# Patient Record
Sex: MALE | Race: WHITE | NOT HISPANIC OR LATINO | Employment: STUDENT | URBAN - METROPOLITAN AREA
[De-identification: names, ages, dates, MRNs, and addresses within clinical notes are randomized per-mention and may not be internally consistent; named-entity substitution may affect disease eponyms.]

---
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
## 2024-02-09 ENCOUNTER — OFFICE VISIT (OUTPATIENT)
Dept: FAMILY MEDICINE CLINIC | Facility: CLINIC | Age: 24
End: 2024-02-09
Payer: COMMERCIAL

## 2024-02-09 VITALS
HEART RATE: 95 BPM | BODY MASS INDEX: 21.53 KG/M2 | TEMPERATURE: 97.2 F | SYSTOLIC BLOOD PRESSURE: 96 MMHG | WEIGHT: 150.4 LBS | DIASTOLIC BLOOD PRESSURE: 52 MMHG | OXYGEN SATURATION: 98 % | RESPIRATION RATE: 20 BRPM | HEIGHT: 70 IN

## 2024-02-09 DIAGNOSIS — G47.9 RESTLESS SLEEPER: ICD-10-CM

## 2024-02-09 DIAGNOSIS — R40.0 HAS DAYTIME DROWSINESS: ICD-10-CM

## 2024-02-09 DIAGNOSIS — R06.83 LOUD SNORING: Primary | ICD-10-CM

## 2024-02-09 PROCEDURE — 99203 OFFICE O/P NEW LOW 30 MIN: CPT

## 2024-02-09 NOTE — PROGRESS NOTES
"Chief Complaint  Sleep Apnea    Subjective          Sleep Apnea  Associated symptoms include fatigue. Pertinent negatives include no abdominal pain, chest pain, chills, congestion, coughing, fever, myalgias, nausea, neck pain, rash, vomiting or weakness.       Margarito Alvarez 23 y.o. male presents today for a new patient appointment. He is here to establish care and is a new patient to me. I reviewed the PFSH recorded today by my MA/LPN staff.       The patient reports restlessness and twitching in sleep, waking up feeling restless, daytime drowsiness, loud snoring, and waking up with a dry mouth.  Symptom onset was around 2 months ago.  Evaluation to date: none.        Review of Systems   Constitutional:  Positive for fatigue. Negative for appetite change, chills and fever.   HENT:  Negative for congestion, sinus pressure and trouble swallowing.         Loud snoring, dry mouth upon awakening   Eyes:  Negative for visual disturbance.   Respiratory:  Negative for cough, chest tightness, shortness of breath and wheezing.    Cardiovascular:  Negative for chest pain, palpitations and leg swelling.   Gastrointestinal:  Negative for abdominal pain, constipation, diarrhea, nausea and vomiting.   Genitourinary:  Negative for dysuria, frequency and urgency.   Musculoskeletal:  Negative for gait problem, myalgias and neck pain.   Skin:  Negative for rash.   Neurological:  Negative for dizziness, syncope, weakness and light-headedness.   Psychiatric/Behavioral:  Positive for sleep disturbance (Restless sleep). Negative for behavioral problems and dysphoric mood. The patient is not nervous/anxious.         Objective   Vital Signs:   BP 96/52   Pulse 95   Temp 97.2 °F (36.2 °C) (Temporal)   Resp 20   Ht 177.8 cm (70\")   Wt 68.2 kg (150 lb 6.4 oz)   SpO2 98%   BMI 21.58 kg/m²      BMI is within normal parameters. No other follow-up for BMI required.       Physical Exam  Vitals and nursing note reviewed.   Constitutional:      "  General: He is not in acute distress.     Appearance: He is well-developed and normal weight.   HENT:      Head: Normocephalic and atraumatic.   Eyes:      General: No scleral icterus.        Right eye: No discharge.         Left eye: No discharge.      Conjunctiva/sclera: Conjunctivae normal.      Pupils: Pupils are equal, round, and reactive to light.   Neck:      Thyroid: No thyromegaly.      Vascular: No carotid bruit.      Trachea: No tracheal deviation.   Cardiovascular:      Rate and Rhythm: Normal rate and regular rhythm.      Pulses: Normal pulses.      Heart sounds: Normal heart sounds. No murmur heard.     No gallop.   Pulmonary:      Effort: Pulmonary effort is normal. No respiratory distress.      Breath sounds: Normal breath sounds. No wheezing or rales.   Musculoskeletal:         General: Normal range of motion.      Cervical back: Normal range of motion and neck supple.   Skin:     General: Skin is warm.      Coloration: Skin is not pale.      Findings: No erythema or rash.   Neurological:      Mental Status: He is alert and oriented to person, place, and time.      Motor: No weakness or abnormal muscle tone.      Coordination: Coordination normal.   Psychiatric:         Behavior: Behavior normal.         Thought Content: Thought content normal.         Judgment: Judgment normal.                         Assessment and Plan      Diagnoses and all orders for this visit:    1. Loud snoring (Primary)  Comments:  New patient  Referral to sleep medicine for further evaluation  Labs ordered  Return if symptoms worsen  Orders:  -     Ambulatory Referral to Sleep Medicine  -     Comprehensive metabolic panel  -     Lipid panel  -     CBC and Differential  -     TSH    2. Has daytime drowsiness  Comments:  New patient  Referral to sleep medicine for further evaluation  Labs ordered  Return if symptoms worsen  Orders:  -     Ambulatory Referral to Sleep Medicine  -     Comprehensive metabolic panel  -      Lipid panel  -     CBC and Differential  -     TSH    3. Restless sleeper  Comments:  New patient  Referral to sleep medicine for further evaluation  Labs ordered  Return if symptoms worsen  Orders:  -     Ambulatory Referral to Sleep Medicine  -     Comprehensive metabolic panel  -     Lipid panel  -     CBC and Differential  -     TSH            Follow Up     Return if symptoms worsen or fail to improve.    Patient was given instructions and counseling regarding his condition or for health maintenance advice. Please see specific information pulled into the AVS if appropriate.

## 2024-02-17 LAB
ALBUMIN SERPL-MCNC: 5 G/DL (ref 4.3–5.2)
ALBUMIN/GLOB SERPL: 2.6 {RATIO} (ref 1.2–2.2)
ALP SERPL-CCNC: 59 IU/L (ref 44–121)
ALT SERPL-CCNC: 23 IU/L (ref 0–44)
AST SERPL-CCNC: 25 IU/L (ref 0–40)
BASOPHILS # BLD AUTO: 0 X10E3/UL (ref 0–0.2)
BASOPHILS NFR BLD AUTO: 0 %
BILIRUB SERPL-MCNC: 0.3 MG/DL (ref 0–1.2)
BUN SERPL-MCNC: 18 MG/DL (ref 6–20)
BUN/CREAT SERPL: 16 (ref 9–20)
CALCIUM SERPL-MCNC: 9.7 MG/DL (ref 8.7–10.2)
CHLORIDE SERPL-SCNC: 102 MMOL/L (ref 96–106)
CHOLEST SERPL-MCNC: 160 MG/DL (ref 100–199)
CO2 SERPL-SCNC: 27 MMOL/L (ref 20–29)
CREAT SERPL-MCNC: 1.14 MG/DL (ref 0.76–1.27)
EGFRCR SERPLBLD CKD-EPI 2021: 92 ML/MIN/1.73
EOSINOPHIL # BLD AUTO: 0.1 X10E3/UL (ref 0–0.4)
EOSINOPHIL NFR BLD AUTO: 1 %
ERYTHROCYTE [DISTWIDTH] IN BLOOD BY AUTOMATED COUNT: 11.9 % (ref 11.6–15.4)
GLOBULIN SER CALC-MCNC: 1.9 G/DL (ref 1.5–4.5)
GLUCOSE SERPL-MCNC: 81 MG/DL (ref 70–99)
HCT VFR BLD AUTO: 44.3 % (ref 37.5–51)
HDLC SERPL-MCNC: 58 MG/DL
HGB BLD-MCNC: 14.7 G/DL (ref 13–17.7)
IMM GRANULOCYTES # BLD AUTO: 0 X10E3/UL (ref 0–0.1)
IMM GRANULOCYTES NFR BLD AUTO: 0 %
LDLC SERPL CALC-MCNC: 90 MG/DL (ref 0–99)
LYMPHOCYTES # BLD AUTO: 2.7 X10E3/UL (ref 0.7–3.1)
LYMPHOCYTES NFR BLD AUTO: 39 %
MCH RBC QN AUTO: 30.1 PG (ref 26.6–33)
MCHC RBC AUTO-ENTMCNC: 33.2 G/DL (ref 31.5–35.7)
MCV RBC AUTO: 91 FL (ref 79–97)
MONOCYTES # BLD AUTO: 0.5 X10E3/UL (ref 0.1–0.9)
MONOCYTES NFR BLD AUTO: 8 %
NEUTROPHILS # BLD AUTO: 3.5 X10E3/UL (ref 1.4–7)
NEUTROPHILS NFR BLD AUTO: 52 %
PLATELET # BLD AUTO: 263 X10E3/UL (ref 150–450)
POTASSIUM SERPL-SCNC: 4.5 MMOL/L (ref 3.5–5.2)
PROT SERPL-MCNC: 6.9 G/DL (ref 6–8.5)
RBC # BLD AUTO: 4.89 X10E6/UL (ref 4.14–5.8)
SODIUM SERPL-SCNC: 140 MMOL/L (ref 134–144)
TRIGL SERPL-MCNC: 60 MG/DL (ref 0–149)
TSH SERPL DL<=0.005 MIU/L-ACNC: 1.98 UIU/ML (ref 0.45–4.5)
VLDLC SERPL CALC-MCNC: 12 MG/DL (ref 5–40)
WBC # BLD AUTO: 6.8 X10E3/UL (ref 3.4–10.8)

## 2024-03-21 ENCOUNTER — OFFICE VISIT (OUTPATIENT)
Dept: SLEEP MEDICINE | Facility: HOSPITAL | Age: 24
End: 2024-03-21
Payer: COMMERCIAL

## 2024-03-21 VITALS — HEIGHT: 70 IN | BODY MASS INDEX: 21.47 KG/M2 | OXYGEN SATURATION: 98 % | WEIGHT: 150 LBS | HEART RATE: 64 BPM

## 2024-03-21 DIAGNOSIS — G47.69 NOCTURNAL MYOCLONUS: ICD-10-CM

## 2024-03-21 DIAGNOSIS — R40.0 HAS DAYTIME DROWSINESS: ICD-10-CM

## 2024-03-21 DIAGNOSIS — R06.83 LOUD SNORING: Primary | ICD-10-CM

## 2024-03-21 DIAGNOSIS — G47.9 RESTLESS SLEEPER: ICD-10-CM

## 2024-03-21 DIAGNOSIS — G47.33 OSA (OBSTRUCTIVE SLEEP APNEA): ICD-10-CM

## 2024-03-21 PROCEDURE — G0463 HOSPITAL OUTPT CLINIC VISIT: HCPCS

## 2024-03-21 NOTE — PROGRESS NOTES
Reason for Consultation: Hypersomnia, snoring and nocturnal myoclonus        Patient Care Team:  Edel Santiago APRN as PCP - General (Family Medicine)  Lorenzo Mares MD, MPH as Consulting Physician (Sleep Medicine)      History of present illness:    Thank you for asking me to see your patient.  The patient is a 23 y.o. male presents with complaints of hypersomnia that he says is temporally associated with the development of snoring which he places back in 2 about June of last year.  He says his bedroom partner observes that he snores and stops breathing.  They also point out that he has myoclonic jerks not just when he is falling asleep at other times.  He smoked from age 17 to last year but stopped.  He does not have any caffeine and denies any alcohol use.  He has history of bipolar affective disorder and uses lamotrigine 300 mg/day.  He observes that his maternal grandfather and grandmother have sleep problems.  His grandfather has sleep apnea and a CPAP machine.  He denies sleepwalking or dream enactment.  He is a restless sleeper and sleeping longer does not really help.  He has gained 10 pounds in the past 5 years.  He works with sheet-metal.  Habitual bedtime is 10 PM and gets up at 4 or 5 AM during the week and on the weekends he goes to bed between 11 PM and midnight gets up between 6 and 10 AM.  He says he is very tired every morning and only takes about 10 minutes to fall asleep.  He does not take any medicine to fall asleep    [place]: 20    Data Reviewed: His sleep questionnaire and medical chart      PMH:  Past Medical History:   Diagnosis Date    ADHD     Bipolar 2 disorder     OCD (obsessive compulsive disorder)           Allergies:  Patient has no known allergies.     Medication Review:   No current outpatient medications on file prior to visit.     No current facility-administered medications on file prior to visit.         Vital Signs:    Vitals:    03/21/24 1310   Pulse: 64   SpO2: 98%  "  Weight: 68 kg (150 lb)   Height: 177.8 cm (70\")        Body mass index is 21.52 kg/m².  Neck Circumference: 14.75 inches      Physical Exam:    Constitutional:  Well developed 23 y.o. male that appears in no apparent distress.  Awake & oriented times 3.  Normal mood with normal recent and remote memory and normal judgement.  Eyes:  Conjunctivae normal.  Oropharynx: Moist mucous membranes without exudate and Mallampati 3 with macroglossia  Neck: Trachea midline  Respiratory: Effort is not labored  Cardiovascular: Radial pulse regular  Musculoskeletal: Gait appears normal, no digital clubbing evident, no pre-tibial edema        Impression:   Encounter Diagnoses   Name Primary?    Loud snoring Yes    Has daytime drowsiness     Restless sleeper     Nocturnal myoclonus     NENA (obstructive sleep apnea)      Patient's BMI is Body mass index is 21.52 kg/m².    This patient has a history consistent with obstructive sleep apnea and hypersomnia.  He also has a history of myoclonic jerks during sleep that are not only during sleep initiation.  Because of that were going to do an in lab split-night polysomnogram.  Possibilities are this hypersomnia related to sleep disordered breathing or he might have hypersomnia condition unrelated to sleep apnea and he does happens to have sonorous snoring.  Myoclonic jerks will need to be further disambiguated from things such as nocturnal seizures which would also make him sleepy.    Plan:    Night polysomnogram with full EEG array.    The patient should practice good sleep hygiene measures.      Pathophysiology of NENA described to the patient.  Cardiovascular complications of untreated NENA also reviewed.      The patient was cautioned about the dangers of drowsy driving.    Micah Mares MD  Sleep Medicine  03/21/24  13:46 EDT         "

## 2024-04-12 ENCOUNTER — HOSPITAL ENCOUNTER (OUTPATIENT)
Dept: SLEEP MEDICINE | Facility: HOSPITAL | Age: 24
End: 2024-04-12
Payer: COMMERCIAL

## 2024-04-12 VITALS — HEIGHT: 70 IN | WEIGHT: 150 LBS | BODY MASS INDEX: 21.47 KG/M2

## 2024-04-12 DIAGNOSIS — G47.33 OSA (OBSTRUCTIVE SLEEP APNEA): ICD-10-CM

## 2024-04-12 DIAGNOSIS — G47.69 NOCTURNAL MYOCLONUS: ICD-10-CM

## 2024-04-12 DIAGNOSIS — R40.0 HAS DAYTIME DROWSINESS: ICD-10-CM

## 2024-04-12 PROCEDURE — 95810 POLYSOM 6/> YRS 4/> PARAM: CPT

## 2024-04-29 ENCOUNTER — OFFICE VISIT (OUTPATIENT)
Dept: SLEEP MEDICINE | Facility: HOSPITAL | Age: 24
End: 2024-04-29
Payer: COMMERCIAL

## 2024-04-29 VITALS
SYSTOLIC BLOOD PRESSURE: 106 MMHG | OXYGEN SATURATION: 98 % | BODY MASS INDEX: 22.33 KG/M2 | HEART RATE: 72 BPM | HEIGHT: 70 IN | WEIGHT: 156 LBS | DIASTOLIC BLOOD PRESSURE: 63 MMHG

## 2024-04-29 DIAGNOSIS — F31.9 BIPOLAR AFFECTIVE DISORDER, REMISSION STATUS UNSPECIFIED: ICD-10-CM

## 2024-04-29 DIAGNOSIS — G47.10 HYPERSOMNIA: Primary | ICD-10-CM

## 2024-04-29 PROCEDURE — G0463 HOSPITAL OUTPT CLINIC VISIT: HCPCS

## 2024-04-29 PROCEDURE — 99214 OFFICE O/P EST MOD 30 MIN: CPT | Performed by: PSYCHIATRY & NEUROLOGY

## 2024-04-29 NOTE — PROGRESS NOTES
"Follow Up [facility] Note     Chief Complaint: Hypersomnia    Primary Care Physician: Edel Santiago APRN    Interval History:   The patient is a 23 y.o. male  who who is really sleepy but had a PSG that showed less than 1 apnea per hour.  He had a good sleep efficiency and normal sleep latencies.  He also had normal REM latency.  He continues to be very sleepy and the cause is not clear.  He is on Lamictal 100 mg and if he has a high serum concentration that might make him sleepy.      Review of Systems:    A complete review of systems was done and all were negative with the exception of insomnia    Social History:    Social History     Socioeconomic History    Marital status: Single   Tobacco Use    Smoking status: Former     Current packs/day: 0.00     Average packs/day: 0.3 packs/day for 6.0 years (1.5 ttl pk-yrs)     Types: Cigarettes     Start date:      Quit date: 2024     Years since quittin.3     Passive exposure: Past    Smokeless tobacco: Never   Vaping Use    Vaping status: Never Used   Substance and Sexual Activity    Alcohol use: No    Drug use: No    Sexual activity: Yes     Partners: Female     Birth control/protection: I.U.D.       Allergies:  Patient has no known allergies.     Medication Review:  Reviewed.      Vital Signs:    Vitals:    24 1500   BP: 106/63   Pulse: 72   SpO2: 98%   Weight: 70.8 kg (156 lb)   Height: 177.8 cm (70\")     Body mass index is 22.38 kg/m².    Physical Exam:    Constitutional:  Well developed 23 y.o. male that appears in no apparent distress.  Awake & oriented times 3.  Normal mood with normal recent and remote memory and normal judgement.  Eyes:  Conjunctivae normal.      Self-administered [place] Sleepiness Scale test results: 19  0-5 Lower normal daytime sleepiness  6-10 Higher normal daytime sleepiness  11-12 Mild, 13-15 Moderate, & 16-24 Severe excessive daytime sleepiness         Impression:     Encounter Diagnoses   Name Primary?    " Hypersomnia Yes    Bipolar affective disorder, remission status unspecified          Plan:  Were going to do another PSG and MSLT to see whether he has narcolepsy or cataplexy.  Were also going to do a drug screen and I will check his  lamotrigine serum concentration sure that he is not toxic on this to explain some of his hypersomnia.  I answered all of the patient's questions.  The patient will call the [facility] for any problems and will follow up in after his PSG and MSLT.      Micah Mares MD  Sleep Medicine  04/29/24  16:04 EDT

## 2024-11-26 ENCOUNTER — OFFICE VISIT (OUTPATIENT)
Dept: SLEEP MEDICINE | Facility: HOSPITAL | Age: 24
End: 2024-11-26
Payer: COMMERCIAL

## 2024-11-26 VITALS — HEART RATE: 98 BPM | BODY MASS INDEX: 22.33 KG/M2 | WEIGHT: 156 LBS | OXYGEN SATURATION: 97 % | HEIGHT: 70 IN

## 2024-11-26 DIAGNOSIS — G47.419 NARCOLEPSY WITHOUT CATAPLEXY: ICD-10-CM

## 2024-11-26 DIAGNOSIS — G47.10 HYPERSOMNIA: Primary | ICD-10-CM

## 2024-11-26 PROCEDURE — G0463 HOSPITAL OUTPT CLINIC VISIT: HCPCS

## 2024-11-26 NOTE — PROGRESS NOTES
"[facility] Note       Primary Care Physician: Edel Santiago APRN    Interval History:   The patient is a 24 y.o. male      History of Present Illness  The patient presents for evaluation of sleepiness.    He reports feeling excessively tired and sleepy. He is currently on Lamictal for bipolar disorder and guanfacine for tics. He expresses a willingness to try any treatment that could alleviate his sleepiness. He mentions that he underwent a drug screen about a month and a half ago due to his use of a stimulant.       I had ordered an MSLT after a PSG but he did not schedule it and come back today to discuss this.  I shared with him that it might be too late to get it done before the end of the year since he is already used up his deductibles.      Review of Systems:    A complete review of systems was done and all were negative with the exception of excessive daytime sleepiness    Social History:    Social History     Socioeconomic History    Marital status: Single   Tobacco Use    Smoking status: Former     Current packs/day: 0.00     Average packs/day: 0.3 packs/day for 6.0 years (1.5 ttl pk-yrs)     Types: Cigarettes     Start date:      Quit date: 2024     Years since quittin.9     Passive exposure: Past    Smokeless tobacco: Never   Vaping Use    Vaping status: Never Used   Substance and Sexual Activity    Alcohol use: No    Drug use: No    Sexual activity: Yes     Partners: Female     Birth control/protection: I.U.D.       Allergies:  Patient has no known allergies.     Medication Review:  Reviewed.      Vital Signs:    Vitals:    24 1029   Pulse: 98   SpO2: 97%   Weight: 70.8 kg (156 lb)   Height: 177.8 cm (70\")     Body mass index is 22.38 kg/m².  .BMIFOLLOWUP    Physical Exam:    Constitutional:  Well developed 24 y.o. male that appears in no apparent distress.  Awake & oriented times 3.  Normal mood with normal recent and remote memory and normal judgement.  Eyes:  " Conjunctivae normal.      Self-administered [place] Sleepiness Scale test results: 20  0-5 Lower normal daytime sleepiness  6-10 Higher normal daytime sleepiness  11-12 Mild, 13-15 Moderate, & 16-24 Severe excessive daytime sleepiness         Impression:     Encounter Diagnoses   Name Primary?    Hypersomnia Yes    Narcolepsy without cataplexy    This patient has narcolepsy without cataplexy or idiopathic hypersomnia but he is very sleepy certainly has a disorder of central hypersomnia.      Assessment & Plan  1. Central hypersomnia.  The sleep study did not indicate sleep apnea, suggesting a likely diagnosis of central hypersomnia. The differential diagnosis includes narcolepsy and idiopathic hypersomnia. The potential side effects of stimulants, such as increased blood pressure and jitteriness, were discussed. An overnight sleep study will be conducted to further evaluate the condition. A drug test will also be performed to ensure no medications are present that could interfere with the study, particularly antidepressants and stimulants.    2. Bipolar disorder.  The patient is currently on Lamictal for bipolar disorder. He should continue this medication as it does not interfere with the sleep study.    3. Medication Management.  The patient is currently on guanfacine, which is a non-stimulant and should not interfere with the sleep study. He reported being on a stimulant about a month and a half ago, but it should not show up in the drug test as stimulants do not last very long.           Good sleep hygiene measures should be maintained.    I answered all of the patient's questions.  The patient will call the [facility] for any problems and will follow up after his ESG and MSLT.  I confirmed that he can take guanfacine and lamotrigine with impunity before both studies..    Patient or patient representative verbalized consent for the use of Ambient Listening during the visit with  Micah Mares MD  for chart documentation. 11/26/2024  14:19 EST           Micah Mares MD  Sleep Medicine  11/26/24  14:19 EST

## 2024-12-22 ENCOUNTER — HOSPITAL ENCOUNTER (OUTPATIENT)
Dept: SLEEP MEDICINE | Facility: HOSPITAL | Age: 24
Discharge: HOME | End: 2024-12-22
Admitting: PSYCHIATRY & NEUROLOGY
Payer: COMMERCIAL

## 2024-12-22 DIAGNOSIS — G47.419 NARCOLEPSY WITHOUT CATAPLEXY: ICD-10-CM

## 2024-12-22 DIAGNOSIS — G47.10 HYPERSOMNIA: ICD-10-CM

## 2024-12-22 PROCEDURE — 95810 POLYSOM 6/> YRS 4/> PARAM: CPT

## 2024-12-23 DIAGNOSIS — G47.33 OSA (OBSTRUCTIVE SLEEP APNEA): ICD-10-CM

## 2024-12-23 DIAGNOSIS — G47.10 HYPERSOMNIA: Primary | ICD-10-CM

## 2024-12-23 DIAGNOSIS — R40.0 HAS DAYTIME DROWSINESS: ICD-10-CM

## 2024-12-23 DIAGNOSIS — G47.9 RESTLESS SLEEPER: ICD-10-CM

## 2024-12-26 ENCOUNTER — TELEPHONE (OUTPATIENT)
Dept: SLEEP MEDICINE | Facility: HOSPITAL | Age: 24
End: 2024-12-26
Payer: COMMERCIAL

## 2025-01-21 ENCOUNTER — TELEPHONE (OUTPATIENT)
Dept: SLEEP MEDICINE | Facility: HOSPITAL | Age: 25
End: 2025-01-21
Payer: COMMERCIAL

## 2025-01-21 NOTE — TELEPHONE ENCOUNTER
Pt called requesting his pressure be lowered  due to difficulties exhaling, pulled download for dr to review for recommendations

## 2025-01-30 DIAGNOSIS — G47.10 HYPERSOMNIA: Primary | ICD-10-CM

## 2025-01-30 DIAGNOSIS — R40.0 HAS DAYTIME DROWSINESS: ICD-10-CM

## 2025-01-30 DIAGNOSIS — G47.33 OSA (OBSTRUCTIVE SLEEP APNEA): ICD-10-CM

## 2025-02-05 ENCOUNTER — DOCUMENTATION (OUTPATIENT)
Facility: HOSPITAL | Age: 25
End: 2025-02-05
Payer: COMMERCIAL

## 2025-02-05 NOTE — PROGRESS NOTES
Received pressure change for patient from Dr. Mares. Called patient to advise of the change. Changes made in [facility].   02/05/2025, 05:58 AM    by Fei Baeza    Settings sent to device    Request [id number]    Set Mode to CPAP  Set Set pressure to 5.0 cmH2O  Set EPR to Fulltime  Set EPR level to 2  Set Ramp enable to On  Set Ramp time to 20 min  Set Start pressure to 5.0 cmH2O  Set Climate Control to Manual  Set Humidifier level to 3  Set Tube temperature to 80°F (27°C)

## 2025-03-25 ENCOUNTER — OFFICE VISIT (OUTPATIENT)
Facility: HOSPITAL | Age: 25
End: 2025-03-25
Payer: COMMERCIAL

## 2025-03-25 VITALS — OXYGEN SATURATION: 96 % | HEART RATE: 86 BPM | HEIGHT: 70 IN | WEIGHT: 169 LBS | BODY MASS INDEX: 24.2 KG/M2

## 2025-03-25 DIAGNOSIS — G47.19 EXCESSIVE DAYTIME SLEEPINESS: ICD-10-CM

## 2025-03-25 DIAGNOSIS — G47.33 OBSTRUCTIVE SLEEP APNEA, ADULT: Primary | ICD-10-CM

## 2025-03-25 PROCEDURE — G0463 HOSPITAL OUTPT CLINIC VISIT: HCPCS

## 2025-03-25 NOTE — PROGRESS NOTES
[facility] Note       Primary Care Physician: Edel Santiago APRN    Interval History:   The patient is a 24 y.o. male      History of Present Illness  The patient presents for evaluation of sleep apnea.    He has been utilizing a CPAP machine for the past 30 days, with a total usage of 27 days. However, he has only managed to use it for more than 4 hours on 13 of those days. He employs a full mask that covers both his nose and mouth. His fiancee has observed a resurgence of snoring since the pressure was reduced to 5. He does not experience difficulty in falling asleep while wearing the mask. Recently, he has initiated the use of magnesium prior to sleep, which has enabled him to use the machine for 5 to 7 hours per night over the past few days. Despite this, he does not report feeling particularly refreshed or alert during the day.    MEDICATIONS  Current: Magnesium         Downloaded PAP Data Evaluated For Therapeutic Response and Compliance:  DME is [facility].  Downloads between 2025 and 3/19/2025.  Average usage is 3 hours and 52 minutes.  Average AHI is 1.3.  PAP pressure is 5 CWP.    The patient uses a hybrid interface.    Review of Systems:    A complete review of systems was done and all were negative with the exception of mask discomfort    Social History:    Social History     Socioeconomic History    Marital status: Single   Tobacco Use    Smoking status: Former     Current packs/day: 0.00     Average packs/day: 0.3 packs/day for 6.0 years (1.5 ttl pk-yrs)     Types: Cigarettes     Start date:      Quit date: 2024     Years since quittin.2     Passive exposure: Past    Smokeless tobacco: Never   Vaping Use    Vaping status: Never Used   Substance and Sexual Activity    Alcohol use: No    Drug use: No    Sexual activity: Yes     Partners: Female     Birth control/protection: I.U.D.       Allergies:  Patient has no known allergies.     Medication Review:  Reviewed.  "     Vital Signs:    Vitals:    03/25/25 1548   Pulse: 86   SpO2: 96%   Weight: 76.7 kg (169 lb)   Height: 177.8 cm (70\")     Body mass index is 24.25 kg/m².  .BMIFOLLOWUP    Physical Exam:    Constitutional:  Well developed 24 y.o. male that appears in no apparent distress.  Awake & oriented times 3.  Normal mood with normal recent and remote memory and normal judgement.  Eyes:  Conjunctivae normal.      Self-administered [place] Sleepiness Scale test results: 18  0-5 Lower normal daytime sleepiness  6-10 Higher normal daytime sleepiness  11-12 Mild, 13-15 Moderate, & 16-24 Severe excessive daytime sleepiness       I have reviewed the above results and compared them with the patient's last downloads and reviewed with the patient.    Impression:     Encounter Diagnoses   Name Primary?    Obstructive sleep apnea, adult Yes    Excessive daytime sleepiness          Assessment & Plan  1. Sleep Apnea.  He has been using the CPAP machine for 30 days, with 27 days of usage, but only 13 days for more than 4 hours. The current pressure setting is at 5, which has led to resumed snoring. The pressure will be adjusted to a range of 5-10 cm H2O. He is advised to continue using the CPAP machine consistently and to report any issues. He is also advised to continue taking magnesium before sleep as it helps him use the machine for longer durations.    Follow-up  The patient will follow up in 1 month.       Good sleep hygiene measures should be maintained.  I am going to liberalize his pressure to go 5 to 10 m water pressure and we will see how he does for another month we need to get his compliance.  I answered all of the patient's questions.  The patient will call the [facility] for any problems and will follow up 1 month.    Patient or patient representative verbalized consent for the use of Ambient Listening during the visit with  Micah Mares MD for chart documentation. 3/26/2025  08:24 EDT           Micah Lassiter " MD [facility]  Sleep Medicine  03/26/25  08:24 EDT

## 2025-03-26 PROBLEM — G47.33 OBSTRUCTIVE SLEEP APNEA, ADULT: Status: ACTIVE | Noted: 2025-03-26

## 2025-03-26 PROBLEM — G47.19 EXCESSIVE DAYTIME SLEEPINESS: Status: ACTIVE | Noted: 2025-03-26

## 2025-04-22 ENCOUNTER — OFFICE VISIT (OUTPATIENT)
Facility: HOSPITAL | Age: 25
End: 2025-04-22
Payer: COMMERCIAL

## 2025-04-22 VITALS — WEIGHT: 158 LBS | HEIGHT: 70 IN | BODY MASS INDEX: 22.62 KG/M2 | HEART RATE: 88 BPM | OXYGEN SATURATION: 97 %

## 2025-04-22 DIAGNOSIS — G47.19 EXCESSIVE DAYTIME SLEEPINESS: Primary | ICD-10-CM

## 2025-04-22 NOTE — PROGRESS NOTES
"Follow Up [facility] Note       Primary Care Physician: Edel Santiago APRN    Interval History:   The patient is a 24 y.o. male      History of Present Illness  The patient presents for evaluation of sleep apnea.    He has been inconsistent in his use of the prescribed device, with a noted decrease in usage over the past few days. He reports experiencing awakenings at night, the cause of which remains unclear to him. Despite these interruptions, he continues to feel sleepy. His work schedule is regular, and he often falls asleep before putting on the device.         Downloaded PAP Data Evaluated For Therapeutic Response and Compliance:  DME is [employer].  Downloads between 3/26/2025 and 2025.  Average usage is 5 hours and 41 minutes.  Average AHI is 2.2.  PAP pressure is 6.1 CWP.    The patient uses a fullface mask interface.    Review of Systems:    A complete review of systems was done and all were negative with the exception of falls asleep sometimes before he gets his mask on    Social History:    Social History     Socioeconomic History    Marital status: Single   Tobacco Use    Smoking status: Former     Current packs/day: 0.00     Average packs/day: 0.3 packs/day for 6.0 years (1.5 ttl pk-yrs)     Types: Cigarettes     Start date:      Quit date: 2024     Years since quittin.3     Passive exposure: Past    Smokeless tobacco: Never   Vaping Use    Vaping status: Never Used   Substance and Sexual Activity    Alcohol use: No    Drug use: No    Sexual activity: Yes     Partners: Female     Birth control/protection: I.U.D.       Allergies:  Patient has no known allergies.     Medication Review:  Reviewed.      Vital Signs:    Vitals:    25 1534   Pulse: 88   SpO2: 97%   Weight: 71.7 kg (158 lb)   Height: 177.8 cm (70\")     Body mass index is 22.67 kg/m².  .BMIFOLLOWUP    Physical Exam:    Constitutional:  Well developed 24 y.o. male that appears in no apparent " distress.  Awake & oriented times 3.  Normal mood with normal recent and remote memory and normal judgement.  Eyes:  Conjunctivae normal.      Self-administered [place] Sleepiness Scale test results:  18  0-5 Lower normal daytime sleepiness  6-10 Higher normal daytime sleepiness  11-12 Mild, 13-15 Moderate, & 16-24 Severe excessive daytime sleepiness        I have reviewed the above results and compared them with the patient's last downloads and reviewed with the patient.    Impression:     Encounter Diagnoses   Name Primary?    Excessive daytime sleepiness Yes         Assessment & Plan  1. Sleep Apnea.  His sleep apnea is currently under control, but he needs to increase the usage of his CPAP machine to meet the insurance requirements. He is currently at 65% usage for more than 4 hours per night, but he needs to reach 70% to avoid returning the device. He reports feeling tired and often falls asleep before putting the mask on. He is advised to use the CPAP machine more consistently to ensure proper treatment and to avoid the risk of having to return the device. If he does not increase usage, he may have to return the CPAP machine.    Follow-up  The patient will follow up in 1 month.         Good sleep hygiene measures should be maintained.        After evaluating the patient and assessing results available, the patient is benefiting from the treatment being provided.     The patient will continue CPAP but he can increase his compliance and I will see him back in another 4 weeks.  Potential side effects of PAP therapy reviewed and addressed as needed.  After clinical evaluation and review of downloads, I recommend no changes to the patient's pressures.      I answered all of the patient's questions.  The patient will call the [facility] for any problems and will follow up 4 weeks.    Patient or patient representative verbalized consent for the use of Ambient Listening during the visit with  Micah Lassiter  MD [facility] for chart documentation. 4/22/2025  15:42 EDT           Micah Mares MD  Sleep Medicine  04/22/25  15:41 EDT

## 2025-06-25 ENCOUNTER — HOSPITAL ENCOUNTER (EMERGENCY)
Facility: HOSPITAL | Age: 25
Discharge: HOME | End: 2025-06-26
Attending: EMERGENCY MEDICINE
Payer: COMMERCIAL

## 2025-06-25 DIAGNOSIS — E87.6 HYPOKALEMIA: ICD-10-CM

## 2025-06-25 DIAGNOSIS — R68.89 DIFFICULTY IN VOLUNTARY MOVEMENT: ICD-10-CM

## 2025-06-25 DIAGNOSIS — R40.0 DROWSINESS: Primary | ICD-10-CM

## 2025-06-25 LAB
ALBUMIN SERPL-MCNC: 4.7 G/DL (ref 3.5–5.2)
ALBUMIN/GLOB SERPL: 2 G/DL
ALP SERPL-CCNC: 64 U/L (ref 39–117)
ALT SERPL W P-5'-P-CCNC: 24 U/L (ref 1–41)
ANION GAP SERPL CALCULATED.3IONS-SCNC: 10.5 MMOL/L (ref 5–15)
AST SERPL-CCNC: 25 U/L (ref 1–40)
BASOPHILS # BLD AUTO: 0.03 10*3/MM3 (ref 0–0.2)
BASOPHILS NFR BLD AUTO: 0.4 % (ref 0–1.5)
BILIRUB SERPL-MCNC: 0.2 MG/DL (ref 0–1.2)
BILIRUB UR QL STRIP: NEGATIVE
BUN SERPL-MCNC: 21 MG/DL (ref 6–20)
BUN/CREAT SERPL: 18.9 (ref 7–25)
CALCIUM SPEC-SCNC: 9.7 MG/DL (ref 8.6–10.5)
CHLORIDE SERPL-SCNC: 106 MMOL/L (ref 98–107)
CK SERPL-CCNC: 330 U/L (ref 20–200)
CLARITY UR: ABNORMAL
CO2 SERPL-SCNC: 23.5 MMOL/L (ref 22–29)
COLOR UR: YELLOW
CREAT SERPL-MCNC: 1.11 MG/DL (ref 0.76–1.27)
DEPRECATED RDW RBC AUTO: 38.2 FL (ref 37–54)
EGFRCR SERPLBLD CKD-EPI 2021: 93.9 ML/MIN/1.73
EOSINOPHIL # BLD AUTO: 0.06 10*3/MM3 (ref 0–0.4)
EOSINOPHIL NFR BLD AUTO: 0.9 % (ref 0.3–6.2)
ERYTHROCYTE [DISTWIDTH] IN BLOOD BY AUTOMATED COUNT: 11.7 % (ref 12.3–15.4)
GLOBULIN UR ELPH-MCNC: 2.3 GM/DL
GLUCOSE SERPL-MCNC: 101 MG/DL (ref 65–99)
GLUCOSE UR STRIP-MCNC: NEGATIVE MG/DL
HCT VFR BLD AUTO: 41.6 % (ref 37.5–51)
HGB BLD-MCNC: 14.1 G/DL (ref 13–17.7)
HGB UR QL STRIP.AUTO: NEGATIVE
HOLD SPECIMEN: NORMAL
HOLD SPECIMEN: NORMAL
IMM GRANULOCYTES # BLD AUTO: 0.02 10*3/MM3 (ref 0–0.05)
IMM GRANULOCYTES NFR BLD AUTO: 0.3 % (ref 0–0.5)
KETONES UR QL STRIP: ABNORMAL
LEUKOCYTE ESTERASE UR QL STRIP.AUTO: NEGATIVE
LYMPHOCYTES # BLD AUTO: 3.13 10*3/MM3 (ref 0.7–3.1)
LYMPHOCYTES NFR BLD AUTO: 44.6 % (ref 19.6–45.3)
MAGNESIUM SERPL-MCNC: 2.7 MG/DL (ref 1.6–2.6)
MCH RBC QN AUTO: 30.7 PG (ref 26.6–33)
MCHC RBC AUTO-ENTMCNC: 33.9 G/DL (ref 31.5–35.7)
MCV RBC AUTO: 90.4 FL (ref 79–97)
MONOCYTES # BLD AUTO: 0.63 10*3/MM3 (ref 0.1–0.9)
MONOCYTES NFR BLD AUTO: 9 % (ref 5–12)
NEUTROPHILS NFR BLD AUTO: 3.15 10*3/MM3 (ref 1.7–7)
NEUTROPHILS NFR BLD AUTO: 44.8 % (ref 42.7–76)
NITRITE UR QL STRIP: NEGATIVE
NRBC BLD AUTO-RTO: 0 /100 WBC (ref 0–0.2)
PH UR STRIP.AUTO: 7.5 [PH] (ref 5–8)
PLATELET # BLD AUTO: 273 10*3/MM3 (ref 140–450)
PMV BLD AUTO: 9.1 FL (ref 6–12)
POTASSIUM SERPL-SCNC: 3.4 MMOL/L (ref 3.5–5.2)
PROT SERPL-MCNC: 7 G/DL (ref 6–8.5)
PROT UR QL STRIP: ABNORMAL
RBC # BLD AUTO: 4.6 10*6/MM3 (ref 4.14–5.8)
SODIUM SERPL-SCNC: 140 MMOL/L (ref 136–145)
SP GR UR STRIP: 1.02 (ref 1–1.03)
TROPONIN T SERPL HS-MCNC: <6 NG/L
UROBILINOGEN UR QL STRIP: ABNORMAL
WBC NRBC COR # BLD AUTO: 7.02 10*3/MM3 (ref 3.4–10.8)
WHOLE BLOOD HOLD COAG: NORMAL
WHOLE BLOOD HOLD SPECIMEN: NORMAL

## 2025-06-25 PROCEDURE — 93010 ELECTROCARDIOGRAM REPORT: CPT | Performed by: INTERNAL MEDICINE

## 2025-06-25 PROCEDURE — 93005 ELECTROCARDIOGRAM TRACING: CPT

## 2025-06-25 PROCEDURE — 83735 ASSAY OF MAGNESIUM: CPT

## 2025-06-25 PROCEDURE — 36415 COLL VENOUS BLD VENIPUNCTURE: CPT

## 2025-06-25 PROCEDURE — 81003 URINALYSIS AUTO W/O SCOPE: CPT | Performed by: PHYSICIAN ASSISTANT

## 2025-06-25 PROCEDURE — 99284 EMERGENCY DEPT VISIT MOD MDM: CPT

## 2025-06-25 PROCEDURE — 84484 ASSAY OF TROPONIN QUANT: CPT

## 2025-06-25 PROCEDURE — 93005 ELECTROCARDIOGRAM TRACING: CPT | Performed by: EMERGENCY MEDICINE

## 2025-06-25 PROCEDURE — 25810000003 SODIUM CHLORIDE 0.9 % SOLUTION: Performed by: PHYSICIAN ASSISTANT

## 2025-06-25 PROCEDURE — 82550 ASSAY OF CK (CPK): CPT | Performed by: PHYSICIAN ASSISTANT

## 2025-06-25 PROCEDURE — 80053 COMPREHEN METABOLIC PANEL: CPT

## 2025-06-25 PROCEDURE — 85025 COMPLETE CBC W/AUTO DIFF WBC: CPT

## 2025-06-25 RX ORDER — SODIUM CHLORIDE 0.9 % (FLUSH) 0.9 %
10 SYRINGE (ML) INJECTION AS NEEDED
Status: DISCONTINUED | OUTPATIENT
Start: 2025-06-25 | End: 2025-06-26 | Stop reason: HOSPADM

## 2025-06-25 RX ORDER — POTASSIUM CHLORIDE 1500 MG/1
40 TABLET, EXTENDED RELEASE ORAL ONCE
Status: COMPLETED | OUTPATIENT
Start: 2025-06-25 | End: 2025-06-25

## 2025-06-25 RX ADMIN — SODIUM CHLORIDE 1000 ML: 9 INJECTION, SOLUTION INTRAVENOUS at 22:43

## 2025-06-25 RX ADMIN — POTASSIUM CHLORIDE 40 MEQ: 1500 TABLET, EXTENDED RELEASE ORAL at 22:46

## 2025-06-26 VITALS
WEIGHT: 150 LBS | BODY MASS INDEX: 22.73 KG/M2 | HEIGHT: 68 IN | DIASTOLIC BLOOD PRESSURE: 73 MMHG | HEART RATE: 63 BPM | RESPIRATION RATE: 18 BRPM | SYSTOLIC BLOOD PRESSURE: 115 MMHG | TEMPERATURE: 97.9 F | OXYGEN SATURATION: 99 %

## 2025-06-26 LAB
QT INTERVAL: 373 MS
QTC INTERVAL: 405 MS

## 2025-06-26 NOTE — ED TRIAGE NOTES
"Pt to ED with c/o depressive episode today. States he was studying and felt like his body became paralyzed and could not move for over an hour to call family. Pt has been working outside in the heat. Pt still feels \"out of it\".   Hx of bipolar   "

## 2025-06-26 NOTE — DISCHARGE INSTRUCTIONS
Follow-up with primary care provider.  Take all prescribed medications.  Get plenty of rest, hydrate well.  Return to emergency department for any worsening symptoms.

## 2025-06-26 NOTE — ED PROVIDER NOTES
EMERGENCY DEPARTMENT ENCOUNTER  Room Number:  07/07  PCP: Edel Santiago APRN  Independent Historians: Historian: Patient and Family      HPI:  Chief Complaint: had concerns including Syncope.     A complete HPI/ROS/PMH/PSH/SH/FH are unobtainable due to: EM_Unobtainable History : None    Chronic or social conditions impacting patient care (Social Determinants of Health): None      Context: Margarito Alvarez is a 25 y.o. male with a medical history of ADHD, OCD, bipolar disorder, narcolepsy who presents to the ED c/o acute episode of decreased movement.  Patient reports he has several significant stressors in his life.  He has a big test coming up that he was starting for today, he is getting  in the near future, he just moved into a house with his fiancée.  Reports this will occasionally cause episodes where he has trouble moving and gets depressed.  He drank 3 Monster energy drinks today to try and help him study but he was still having trouble focusing.  He went to sit on the couch and reports for upwards of an hour he had trouble moving to reach his phone and call his mom.  He did not syncopize.  States he feels fatigued and drowsy.  He does work outside of in the heat.  Endorses compliance with his psychiatric medications.  No headache, vision loss, vomiting.  No chest pressure or dyspnea.  No other systemic complaints at this time.      Review of prior external notes (non-ED) -and- Review of prior external test results outside of this encounter: Patient seen in office by sleep medicine on 4/22/2025 for excessive daytime sleepiness.  Reviewed assessment and plan.  Will increase CPAP adjustment. Reviewed labs collected on 2/16/2024.  CBC with hemoglobin 14.7, CMP with creatinine 1.14.    Prescription drug monitoring program review:     EM_[place] : N/A    PAST MEDICAL HISTORY  Active Ambulatory Problems     Diagnosis Date Noted    Obstructive sleep apnea, adult 03/26/2025    Excessive daytime  sleepiness 2025     Resolved Ambulatory Problems     Diagnosis Date Noted    No Resolved Ambulatory Problems     Past Medical History:   Diagnosis Date    ADHD     Bipolar 2 disorder     OCD (obsessive compulsive disorder)          PAST SURGICAL HISTORY  No past surgical history on file.      FAMILY HISTORY  Family History   Problem Relation Age of Onset    Hypertension Mother     No Known Problems Father          SOCIAL HISTORY  Social History     Socioeconomic History    Marital status: Single   Tobacco Use    Smoking status: Former     Current packs/day: 0.00     Average packs/day: 0.3 packs/day for 6.0 years (1.5 ttl pk-yrs)     Types: Cigarettes     Start date:      Quit date: 2024     Years since quittin.4     Passive exposure: Past    Smokeless tobacco: Never   Vaping Use    Vaping status: Never Used   Substance and Sexual Activity    Alcohol use: No    Drug use: No    Sexual activity: Yes     Partners: Female     Birth control/protection: I.U.D.         ALLERGIES  Patient has no known allergies.      REVIEW OF SYSTEMS  Included in HPI  All systems reviewed and negative except for those discussed in HPI.      PHYSICAL EXAM    I have reviewed the triage vital signs and nursing notes.    ED Triage Vitals   Temp Heart Rate Resp BP SpO2   25   97.9 °F (36.6 °C) 80 18 120/90 100 %      Temp src Heart Rate Source Patient Position BP Location FiO2 (%)   25 -- -- --   Oral Monitor          Physical Exam  Constitutional:       General: He is not in acute distress.     Appearance: Normal appearance.   HENT:      Head: Normocephalic and atraumatic.      Nose: Nose normal.      Mouth/Throat:      Mouth: Mucous membranes are moist.   Eyes:      Conjunctiva/sclera: Conjunctivae normal.      Pupils: Pupils are equal, round, and reactive to light.   Cardiovascular:      Rate and Rhythm: Normal rate and regular rhythm.       Pulses: Normal pulses.      Heart sounds: Normal heart sounds.      Comments: Distal pulses intact  Pulmonary:      Effort: Pulmonary effort is normal.      Breath sounds: Normal breath sounds.   Abdominal:      General: There is no distension.   Musculoskeletal:         General: Normal range of motion.      Cervical back: Normal range of motion and neck supple.      Comments: Moving all extremities without difficulty   Skin:     General: Skin is warm.      Capillary Refill: Capillary refill takes less than 2 seconds.   Neurological:      General: No focal deficit present.      Mental Status: He is alert and oriented to person, place, and time.   Psychiatric:         Mood and Affect: Mood normal.             LAB RESULTS  Recent Results (from the past 24 hours)   ECG 12 Lead Syncope    Collection Time: 06/25/25  9:34 PM   Result Value Ref Range    QT Interval 373 ms    QTC Interval 405 ms   Comprehensive Metabolic Panel    Collection Time: 06/25/25  9:35 PM    Specimen: Arm, Right; Blood   Result Value Ref Range    Glucose 101 (H) 65 - 99 mg/dL    BUN 21.0 (H) 6.0 - 20.0 mg/dL    Creatinine 1.11 0.76 - 1.27 mg/dL    Sodium 140 136 - 145 mmol/L    Potassium 3.4 (L) 3.5 - 5.2 mmol/L    Chloride 106 98 - 107 mmol/L    CO2 23.5 22.0 - 29.0 mmol/L    Calcium 9.7 8.6 - 10.5 mg/dL    Total Protein 7.0 6.0 - 8.5 g/dL    Albumin 4.7 3.5 - 5.2 g/dL    ALT (SGPT) 24 1 - 41 U/L    AST (SGOT) 25 1 - 40 U/L    Alkaline Phosphatase 64 39 - 117 U/L    Total Bilirubin 0.2 0.0 - 1.2 mg/dL    Globulin 2.3 gm/dL    A/G Ratio 2.0 g/dL    BUN/Creatinine Ratio 18.9 7.0 - 25.0    Anion Gap 10.5 5.0 - 15.0 mmol/L    eGFR 93.9 >60.0 mL/min/1.73   Magnesium    Collection Time: 06/25/25  9:35 PM    Specimen: Arm, Right; Blood   Result Value Ref Range    Magnesium 2.7 (H) 1.6 - 2.6 mg/dL   High Sensitivity Troponin T    Collection Time: 06/25/25  9:35 PM    Specimen: Arm, Right; Blood   Result Value Ref Range    HS Troponin T <6 <22 ng/L    Green Top (Gel)    Collection Time: 06/25/25  9:35 PM   Result Value Ref Range    Extra Tube Hold for add-ons.    Lavender Top    Collection Time: 06/25/25  9:35 PM   Result Value Ref Range    Extra Tube hold for add-on    Gold Top - SST    Collection Time: 06/25/25  9:35 PM   Result Value Ref Range    Extra Tube Hold for add-ons.    Light Blue Top    Collection Time: 06/25/25  9:35 PM   Result Value Ref Range    Extra Tube Hold for add-ons.    CBC Auto Differential    Collection Time: 06/25/25  9:35 PM    Specimen: Arm, Right; Blood   Result Value Ref Range    WBC 7.02 3.40 - 10.80 10*3/mm3    RBC 4.60 4.14 - 5.80 10*6/mm3    Hemoglobin 14.1 13.0 - 17.7 g/dL    Hematocrit 41.6 37.5 - 51.0 %    MCV 90.4 79.0 - 97.0 fL    MCH 30.7 26.6 - 33.0 pg    MCHC 33.9 31.5 - 35.7 g/dL    RDW 11.7 (L) 12.3 - 15.4 %    RDW-SD 38.2 37.0 - 54.0 fl    MPV 9.1 6.0 - 12.0 fL    Platelets 273 140 - 450 10*3/mm3    Neutrophil % 44.8 42.7 - 76.0 %    Lymphocyte % 44.6 19.6 - 45.3 %    Monocyte % 9.0 5.0 - 12.0 %    Eosinophil % 0.9 0.3 - 6.2 %    Basophil % 0.4 0.0 - 1.5 %    Immature Grans % 0.3 0.0 - 0.5 %    Neutrophils, Absolute 3.15 1.70 - 7.00 10*3/mm3    Lymphocytes, Absolute 3.13 (H) 0.70 - 3.10 10*3/mm3    Monocytes, Absolute 0.63 0.10 - 0.90 10*3/mm3    Eosinophils, Absolute 0.06 0.00 - 0.40 10*3/mm3    Basophils, Absolute 0.03 0.00 - 0.20 10*3/mm3    Immature Grans, Absolute 0.02 0.00 - 0.05 10*3/mm3    nRBC 0.0 0.0 - 0.2 /100 WBC   CK    Collection Time: 06/25/25  9:35 PM    Specimen: Arm, Right; Blood   Result Value Ref Range    Creatine Kinase 330 (H) 20 - 200 U/L   Urinalysis With Culture If Indicated - Urine, Clean Catch    Collection Time: 06/25/25 11:15 PM    Specimen: Urine, Clean Catch   Result Value Ref Range    Color, UA Yellow Yellow, Straw    Appearance, UA Turbid (A) Clear    pH, UA 7.5 5.0 - 8.0    Specific Gravity, UA 1.024 1.005 - 1.030    Glucose, UA Negative Negative    Ketones, UA Trace (A) Negative     Bilirubin, UA Negative Negative    Blood, UA Negative Negative    Protein, UA Trace (A) Negative    Leuk Esterase, UA Negative Negative    Nitrite, UA Negative Negative    Urobilinogen, UA 0.2 E.U./dL 0.2 - 1.0 E.U./dL           MEDICATIONS GIVEN IN ER  Medications   sodium chloride 0.9 % flush 10 mL (has no administration in time range)   sodium chloride 0.9 % flush 10 mL (has no administration in time range)   sodium chloride 0.9 % bolus 1,000 mL (0 mL Intravenous Stopped 6/26/25 0004)   potassium chloride (KLOR-CON M20) CR tablet 40 mEq (40 mEq Oral Given 6/25/25 2246)         ORDERS PLACED DURING THIS VISIT:  Orders Placed This Encounter   Procedures    [facility] Draw    Comprehensive Metabolic Panel    Magnesium    High Sensitivity Troponin T    CBC Auto Differential    CK    Urinalysis With Culture If Indicated - Urine, Clean Catch    NPO Diet NPO Type: Strict NPO    Undress & Gown    Continuous Pulse Oximetry    Vital Signs    Orthostatic Blood Pressure    Oxygen Therapy- Nasal Cannula; Titrate 1-6 LPM Per SpO2; 90 - 95%    POC Glucose Once    ECG 12 Lead Syncope    Insert Peripheral IV    Insert Peripheral IV    CBC & Differential    Green Top (Gel)    Lavender Top    Gold Top - SST    Light Blue Top         OUTPATIENT MEDICATION MANAGEMENT:  Current Facility-Administered Medications Ordered in Epic   Medication Dose Route Frequency Provider Last Rate Last Admin    sodium chloride 0.9 % flush 10 mL  10 mL Intravenous PRN Jasvir Moncada MD        sodium chloride 0.9 % flush 10 mL  10 mL Intravenous PRN Airam Encinas PA-C         No current [facility]-ordered outpatient medications on file.           PROGRESS, DATA ANALYSIS, CONSULTS, AND MEDICAL DECISION MAKING  All labs have been independently interpreted by me.  All radiology studies have been reviewed by me. All EKG's have been independently viewed and interpreted by me.  Discussion below represents my analysis of pertinent findings related to patient's  condition, differential diagnosis, treatment plan and final disposition.    Differential diagnosis includes but is not limited to catatonia, bipolar disorder, rhabdomyolysis.        ED Course as of 06/26/25 0010   Wed Jun 25, 2025 2351 WBC: 7.02 [MP]   2351 Hemoglobin: 14.1 [MP]   2351 BUN(!): 21.0 [MP]   2351 Creatinine: 1.11 [MP]   2351 Potassium(!): 3.4 [MP]   2351 Creatine Kinase(!): 330 [MP]   2351 Ketones, UA(!): Trace [MP]   2351 I have reassessed the patient.  He is still moving without any difficulty.  Reports he feels improved after IV fluid.  Able to tolerate p.o. potassium without difficulty.  Encouraged him to continue home medications, follow-up with PCP and psychiatrist.  Discussed return precautions.  He is otherwise well-appearing, hemodynamically stable, and therefore appropriate for discharge. [MP]      ED Course User Index  [MP] Airam Encinas PA-C             AS OF 00:10 EDT VITALS:    BP - 115/73  HR - 63  TEMP - 97.9 °F (36.6 °C) (Oral)  O2 SATS - 99%    COMPLEXITY OF CARE  Admission was considered but after careful review of the patient's presentation, physical examination, diagnostic results, and response to treatment the patient may be safely discharged with outpatient follow-up.      DIAGNOSIS  Final diagnoses:   Drowsiness   Difficulty in voluntary movement   Hypokalemia         DISPOSITION  ED Disposition       ED Disposition   Discharge    Condition   Stable    Comment   --                Please note that portions of this document were completed with a voice recognition program.    Note Disclaimer: At [facility], we believe that sharing information builds trust and better relationships. You are receiving this note because you recently visited [facility]. It is possible you will see health information before a provider has talked with you about it. This kind of information can be easy to misunderstand. To help you fully understand what it means for your health, we urge you  to discuss this note with your provider.     Airam Encinas PA-C  06/26/25 0011